# Patient Record
Sex: MALE | Race: AMERICAN INDIAN OR ALASKA NATIVE
[De-identification: names, ages, dates, MRNs, and addresses within clinical notes are randomized per-mention and may not be internally consistent; named-entity substitution may affect disease eponyms.]

---

## 2019-02-15 ENCOUNTER — HOSPITAL ENCOUNTER (EMERGENCY)
Dept: HOSPITAL 5 - ED | Age: 19
LOS: 1 days | Discharge: HOME | End: 2019-02-16
Payer: COMMERCIAL

## 2019-02-15 DIAGNOSIS — S91.311A: Primary | ICD-10-CM
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
## 2019-02-15 DIAGNOSIS — J45.909: ICD-10-CM
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
## 2019-02-15 PROCEDURE — 90471 IMMUNIZATION ADMIN: CPT

## 2019-02-15 PROCEDURE — 90715 TDAP VACCINE 7 YRS/> IM: CPT

## 2019-02-16 VITALS — DIASTOLIC BLOOD PRESSURE: 78 MMHG | SYSTOLIC BLOOD PRESSURE: 116 MMHG

## 2019-02-16 NOTE — EMERGENCY DEPARTMENT REPORT
ED Lower Extremity HPI





- General


Chief Complaint: Extremity Injury, Lower


Stated Complaint: STEPPED ON A NAIL


Time Seen by Provider: 19 04:19


Source: patient


Mode of arrival: Ambulatory


Limitations: No Limitations





- History of Present Illness


Initial Comments: 





Mr. Chisholm is a 18-year-old female who presents for right foot pain status post

stepping on a nail 1 day ago patient states her movement intact states 

superficial but his mother to come to the hospital recheck patient complains of 

2/10 pain  to instep plantar region service patient is ambulatory with steady 

gait is no numbness no tenderness, swelling, or deformity 


MD Complaint: foot injury


Onset/Timin


-: days(s)


Injury: Foot: Right


Type of Injury: puncture wound


Place: home


Severity: moderate


Severity scale (0 -10): 2


Improves With: rest


Worsens With: nothing


Context: stepped on nail


Associated Symptoms: ambulatory





- Related Data


                                  Previous Rx's











 Medication  Instructions  Recorded  Last Taken  Type


 


Ciprofloxacin HCl [Cipro] 500 mg PO BID 10 Days #20 tablet 19 Unknown Rx


 


traMADol [Ultram] 50 mg PO Q6HR PRN #12 tablet 19 Unknown Rx











                                    Allergies











Allergy/AdvReac Type Severity Reaction Status Date / Time


 


No Known Allergies Allergy   Unverified 02/15/19 21:37














ED Review of Systems


ROS: 


Stated complaint: STEPPED ON A NAIL


Other details as noted in HPI





Constitutional: denies: chills, fever


Eyes: denies: eye pain, eye discharge, vision change


ENT: denies: ear pain, throat pain


Respiratory: denies: cough, shortness of breath, wheezing


Cardiovascular: denies: chest pain, palpitations


Endocrine: no symptoms reported


Gastrointestinal: denies: abdominal pain, nausea, diarrhea


Genitourinary: denies: urgency, dysuria


Musculoskeletal: other (puncture wound fore superficial )


Skin: other (puncture wound as above ).  denies: rash, lesions


Neurological: denies: headache, weakness, paresthesias


Psychiatric: denies: anxiety, depression


Hematological/Lymphatic: denies: easy bleeding, easy bruising





ED Past Medical Hx





- Past Medical History


Previous Medical History?: Yes


Hx Asthma: Yes





- Surgical History


Past Surgical History?: No





- Social History


Smoking Status: Never Smoker


Substance Use Type: Marijuana





- Medications


Home Medications: 


                                Home Medications











 Medication  Instructions  Recorded  Confirmed  Last Taken  Type


 


Ciprofloxacin HCl [Cipro] 500 mg PO BID 10 Days #20 tablet 19  Unknown Rx


 


traMADol [Ultram] 50 mg PO Q6HR PRN #12 tablet 19  Unknown Rx














ED Physical Exam





- General


Limitations: No Limitations


General appearance: alert, in no apparent distress





- Head


Head exam: Present: atraumatic, normocephalic





- Eye


Eye exam: Present: normal appearance, PERRL, EOMI


Pupils: Present: normal accommodation





- ENT


ENT exam: Present: mucous membranes moist





- Neck


Neck exam: Present: normal inspection, full ROM.  Absent: tenderness





- Respiratory


Respiratory exam: Present: normal lung sounds bilaterally.  Absent: respiratory 

distress, wheezes, stridor, chest wall tenderness





- Cardiovascular


Cardiovascular Exam: Present: regular rate, normal rhythm, normal heart sounds. 

 Absent: systolic murmur, diastolic murmur, rubs, gallop





- GI/Abdominal


GI/Abdominal exam: Present: soft, normal bowel sounds.  Absent: tenderness, 

bruit, hernia





- Rectal


Rectal exam: Present: deferred





- Extremities Exam


Extremities exam: Present: normal inspection, full ROM (right foot instep 

plantar ), tenderness, normal capillary refill.  Absent: pedal edema, joint 

swelling, calf tenderness





- Expanded Lower Extremity Exam


  ** Right


Foot/Toe exam: Present: tenderness, puncture wound (superficial no bleeding no 

deformity no crepitis rom intact unrestricted pt is ambulatory with steady gait.

  ).  Absent: swelling, abrasion, laceration, ecchymosis, deformity, crepidus, 

dislocation, erythema, amputation, foreign body, calcaneal tenderness, 

tenderness at base of 5th metatarsal, nail avulsion, subungual hematoma


Neuro vascular tendon exam: Absent: pulse deficit, motor deficit, sensory 

deficit, tendon deficit


Gait: Positive: observed and normal





- Back Exam


Back exam: Present: normal inspection, full ROM.  Absent: tenderness, CVA 

tenderness (R), CVA tenderness (L), muscle spasm, rash noted





- Neurological Exam


Neurological exam: Present: alert, oriented X3, CN II-XII intact, normal gait, 

reflexes normal





- Psychiatric


Psychiatric exam: Present: normal affect, normal mood





- Skin


Skin exam: Present: warm, dry, intact, normal color.  Absent: rash





ED Course





                                   Vital Signs











  02/15/19





  21:32


 


Temperature 97.9 F


 


Pulse Rate 91 H


 


Blood Pressure 125/61


 


O2 Sat by Pulse 100





Oximetry 














ED Lower Extremity MDM





- Radiology Data


Radiology results: report reviewed, image reviewed


FINAL REPORT 





PROCEDURE: XR FOOT 3+V RT 





TECHNIQUE: RIGHT foot radiographs, AP, lateral, and oblique views. CPT 39864 











HISTORY: yazmin nail 





COMPARISON: No prior studies are available for comparison. 





FINDINGS: 


Fracture (s) and/or Dislocation(s): None . 





Alignment: Normal . 





Joint space(s): Normal . 





Soft tissues: Normal . 





Bone mineralization: Normal . 





Foreign bodies: None . 





Calcaneal spurring: None . 











IMPRESSION: 


Normal Examination . 











Transcribed By: [facility] 


Dictated By: SWETA MITCHELL MD 


Electronically Authenticated By: SWETA MITCHELL MD 


Signed Date/Time: 19 











DD/DT: 19 


TD/TT: 19








- Medical Decision Making


This is a superficial puncture wound pt is ambulatory distal pulses intact no 

nerve tendor or muscle damage no swelling no deformity xray no foreignbody, 

there is minimal pain , plan: tetanus, cipro , wound care follow up with pcp in 

2 days pt verbalized agreement and understanding of same.


Critical care attestation.: 


If time is entered above; I have spent that time in minutes in the direct care 

of this critically ill patient, excluding procedure time.








ED Disposition


Clinical Impression: 


Puncture wound of foot


Qualifiers:


 Encounter type: initial encounter Laterality: right Qualified Code(s): S91.331A

 - Puncture wound without foreign body, right foot, initial encounter





Disposition: - TO HOME OR SELFCARE


Is pt being admited?: No


Does the pt Need Aspirin: No


Condition: Stable


Instructions:  Puncture Wound (ED)


Prescriptions: 


Ciprofloxacin HCl [Cipro] 500 mg PO BID 10 Days #20 tablet


traMADol [Ultram] 50 mg PO Q6HR PRN #12 tablet


 PRN Reason: Pain


Referrals: 


[facility] [Outside] - 3-5 Days


Forms:  Work/School Release Form(ED)


Time of Disposition: 04:54

## 2019-02-16 NOTE — XRAY REPORT
FINAL REPORT



PROCEDURE:  XR FOOT 3+V RT



TECHNIQUE:  RIGHT foot radiographs, AP, lateral, and oblique views. CPT 89495







HISTORY:  yazmin nail 



COMPARISON:  No prior studies are available for comparison.



FINDINGS:  

Fracture (s) and/or Dislocation(s): None .



Alignment: Normal .



Joint space(s): Normal .



Soft tissues: Normal .



Bone mineralization: Normal .



Foreign bodies: None .



Calcaneal spurring: None .







IMPRESSION:  

Normal Examination .

## 2021-02-13 ENCOUNTER — HOSPITAL ENCOUNTER (EMERGENCY)
Dept: HOSPITAL 5 - ED | Age: 21
Discharge: HOME | End: 2021-02-13
Payer: SELF-PAY

## 2021-02-13 VITALS — DIASTOLIC BLOOD PRESSURE: 87 MMHG | SYSTOLIC BLOOD PRESSURE: 134 MMHG
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
## 2021-02-13 DIAGNOSIS — F12.10: ICD-10-CM
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
## 2021-02-13 DIAGNOSIS — S00.83XA: Primary | ICD-10-CM

## 2021-02-13 DIAGNOSIS — J45.909: ICD-10-CM

## 2021-02-13 DIAGNOSIS — S40.812A: ICD-10-CM
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
## 2021-02-13 DIAGNOSIS — Z79.899: ICD-10-CM

## 2021-02-13 PROCEDURE — 70486 CT MAXILLOFACIAL W/O DYE: CPT

## 2021-02-13 PROCEDURE — 70460 CT HEAD/BRAIN W/DYE: CPT

## 2021-02-13 NOTE — XRAY REPORT
LEFT FOREARM 2 VIEWS



INDICATION / CLINICAL INFORMATION:

Left forearm pain.



COMPARISON:

None available.

 

FINDINGS:



BONES and JOINT(S): No acute fracture or subluxation. No significant arthritis.

SOFT TISSUES: No significant abnormality.



ADDITIONAL FINDINGS: None.



IMPRESSION:

1. No acute findings.



Signer Name: Dangelo Matthews MD 

Signed: 2/13/2021 9:16 PM

Workstation Name: [id number]

## 2021-02-13 NOTE — CAT SCAN REPORT
CT head/brain w con



INDICATION / CLINICAL INFORMATION:

20 years Male; head trauma.



TECHNIQUE: Routine CT head without contrast. All CT scans at this location are performed using CT dos
e reduction for ALARA by means of automated exposure control.



COMPARISON: 

None.



FINDINGS:



BRAIN / INTRACRANIAL CONTENTS: No acute hemorrhage, mass effect, midline shift, hydrocephalus, or acu
te, large territorial infarct. No signs of significant atrophy or chronic infarct. No significant whi
te matter abnormality seen.



CRANIOCERVICAL JUNCTION: No significant abnormality.



ORBITS: No significant abnormality of visualized orbits.

SINUSES / MASTOIDS: Visualized paranasal sinuses and mastoid air cells are essentially clear.



ADDITIONAL FINDINGS: None. 



IMPRESSION:

1. No focal mass, hemorrhage, hydrocephalus, or acute, large territorial infarct. 



Signer Name: Tru Lim MD, III 

Signed: 2/13/2021 9:36 PM

Workstation Name: [email or web address]

## 2021-02-13 NOTE — CAT SCAN REPORT
CT facial bones wo con



INDICATION / CLINICAL INFORMATION:

20 years Male; mandible trauma, swelling, pain.



TECHNIQUE:

Thin cut axial images obtained. Sagittal and coronal reconstructions performed. All CT scans at this 
location are performed using CT dose reduction for ALARA by means of automated exposure control. 



COMPARISON:

None available.



FINDINGS:

Note, the entire mandible is not included on this study, which may be important, given the patient's 
history.



No signs of facial fracture appreciated.



Small mucous retention cyst/polyp seen in both maxillary antra.



Visualized paranasal sinuses are clear.



IMPRESSION:

1. No definitive signs of acute bony facial trauma on this limited CT of the facial region. 



Signer Name: Tru Lim MD, III 

Signed: 2/13/2021 9:38 PM

Workstation Name: [email or web address]